# Patient Record
Sex: MALE | Race: WHITE
[De-identification: names, ages, dates, MRNs, and addresses within clinical notes are randomized per-mention and may not be internally consistent; named-entity substitution may affect disease eponyms.]

---

## 2017-12-15 ENCOUNTER — HOSPITAL ENCOUNTER (EMERGENCY)
Dept: HOSPITAL 80 - FED | Age: 27
Discharge: HOME | End: 2017-12-15
Payer: MEDICAID

## 2017-12-15 VITALS — RESPIRATION RATE: 16 BRPM

## 2017-12-15 VITALS — SYSTOLIC BLOOD PRESSURE: 129 MMHG | TEMPERATURE: 97.7 F | DIASTOLIC BLOOD PRESSURE: 82 MMHG

## 2017-12-15 VITALS — OXYGEN SATURATION: 97 % | HEART RATE: 88 BPM

## 2017-12-15 DIAGNOSIS — Z87.891: ICD-10-CM

## 2017-12-15 DIAGNOSIS — F41.9: Primary | ICD-10-CM

## 2018-10-31 ENCOUNTER — HOSPITAL ENCOUNTER (EMERGENCY)
Dept: HOSPITAL 80 - FED | Age: 28
Discharge: HOME | End: 2018-10-31
Payer: COMMERCIAL

## 2018-10-31 VITALS — DIASTOLIC BLOOD PRESSURE: 74 MMHG | SYSTOLIC BLOOD PRESSURE: 134 MMHG

## 2018-10-31 DIAGNOSIS — F41.9: ICD-10-CM

## 2018-10-31 DIAGNOSIS — R11.2: Primary | ICD-10-CM

## 2018-10-31 DIAGNOSIS — E86.9: ICD-10-CM

## 2018-10-31 NOTE — EDPHY
H & P


Time Seen by Provider: 10/31/18 16:45


HPI/ROS: 





CHIEF COMPLAINT:  Vomiting and anxiety attack





HISTORY OF PRESENT ILLNESS:  The patient is a 28-year-old man with a history of 

anxiety who reports that this morning he woke up nauseous and has vomited 

several times throughout the day.  No diarrhea.  No abdominal pain.  He began 

to have a panic attack and feels short of breath and called EMS.  EMS 

administered Zofran and Phenergan and albuterol.  Patient's nausea has improved 

but he still feels a bit jittery and tachycardic after the albuterol.  He was 

never hypoxic.  No fever.  No diarrhea.  No urinary symptoms.  He does have a 

history of appendectomy.


Severity:  Moderate


Modifying factors:  Improving with the EMS medication





REVIEW OF SYSTEMS:


Constitutional:  denies: chills, fever, recent illness, recent injury


EENTM: denies: blurred vision, double vision, nose congestion


Respiratory:  See HPI denies: cough


Cardiac: denies: chest pain, irregular heart rate, lightheadedness, palpitations


Gastrointestinal/Abdominal:  See HPI denies: abdominal pain, diarrhea, blood 

streaked stools


Genitourinary: denies: dysuria, frequency, hematuria, pain


Musculoskeletal: denies: joint pain, muscle pain


Skin: denies: lesions, rash, jaundice, bruising


Neurological: denies: headache, numbness, paresthesia, tingling, dizziness, 

weakness


Hematologic/Lymphatic: denies: blood clots, easy bleeding, easy bruising


Immunologic/allergic: denies: HIV/AIDS, transplant


 10 systems reviewed and negative except as noted





EXAM:


GENERAL:  Well-appearing, somewhat anxious 


HEAD:  Atraumatic, normocephalic.


EYES:  Pupils equal round and reactive to light, extraocular movements intact, 

sclera anicteric, conjunctiva are normal.


ENT:  TMs normal, nares patent, oropharynx clear without exudates.  Moist 

mucous membranes.


NECK:  Normal range of motion, supple without lymphadenopathy or JVD.


LUNGS:  Breath sounds clear to auscultation bilaterally and equal.  No wheezes 

rales or rhonchi.


HEART:  Regular rate and rhythm without murmurs, rubs or gallops.


ABDOMEN:  Soft, nontender, normoactive bowel sounds.  No guarding, no rebound.  

No masses appreciated. 


BACK:  No CVA tenderness, no spinal tenderness, step-offs or deformities


EXTREMITIES:  Normal range of motion, no pitting or edema.  No clubbing or 

cyanosis.


NEUROLOGICAL:  Cranial nerves II through XII grossly intact.  Normal speech, 

normal gait.  5/5 strength, normal movement in all extremities, normal sensation

, normal reflexes


PSYCH:  Normal mood, normal affect.


SKIN:  Warm, dry, normal turgor, no visible rashes or lesions.








Source: Patient, EMS


Exam Limitations: No limitations





- Medical/Surgical History


Hx Asthma: No


Hx Chronic Respiratory Disease: No


Hx Diabetes: No


Hx Cardiac Disease: No


Hx Renal Disease: No


Hx Cirrhosis: No


Other PMH: REFLUX, APPY





- Family History


Significant Family History: No pertinent family hx





- Social History


Smoking Status: Former smoker


Alcohol Use: Sober


Drug Use: None


Constitutional: 


 Initial Vital Signs











Temperature (C)  36.8 C   10/31/18 17:00


 


Heart Rate  96   10/31/18 17:00


 


Respiratory Rate  18   10/31/18 17:00


 


Blood Pressure  151/112 H  10/31/18 17:00


 


O2 Sat (%)  96   10/31/18 17:00








 











O2 Delivery Mode               Room Air














Allergies/Adverse Reactions: 


 





No Known Allergies Allergy (Unverified 10/31/18 17:03)


 








Home Medications: 














 Medication  Instructions  Recorded


 


Citalopram  10/31/18














Medical Decision Making


ED Course/Re-evaluation: 





Patient is no longer vomiting after receiving medications from EMS.  He is 

oxygenating well.  His abdominal exam is benign.





5:30 p.m. the patient is sleeping comfortably with stable vital signs.  His 

heart rate is 102 while asleep.  Clinically still looks dry.  He has a dry 

tongue.  I will hang 1/3 L of fluid and have him start taking p.o. Fluids.  He 

no longer feels nauseous.  He does not have any abdominal pain or tenderness.  

He is saturating 96% room air while asleep.





7:05 p.m. the patient is doing well.  His vital signs are stable.  He is asking 

for my anxiety medicine.  His heart rate is 90. He does get a little anxious 

his heart rate comes up to 110 when I into the room.  Abdomen feels great but 

he still feels anxious.  I will give him another dose of Ativan.





7:30 p.m. the patient continues to feel well.  He is eager to go home.


Differential Diagnosis: 





Partial list of the Differential diagnosis considered include but were not 

limited to;  gastroenteritis, food poisoning, and although unlikely based on 

the history and physical exam, I also considered obstruction, biliary disease, 

pancreatic disease, diverticulitis, volvulus.  I discussed these differential 

diagnoses and the plan with the patient as well as the usual and expected 

course.  The patient understands that the diagnosis is provisional and that in 

medicine we are not always correct and that further workup is often warranted.  

Usual and customary warnings were given.  All of the patient's questions were 

answered.  The patient was instructed to return to the emergency department 

should the symptoms at all worsen or return, otherwise to followup with the 

physician as we discussed.





- Data Points


Medications Given: 


 








Discontinued Medications





Sodium Chloride (Ns)  1,000 mls @ 0 mls/hr IV EDNOW ONE; Wide Open


   PRN Reason: Protocol


   Stop: 10/31/18 16:44


   Last Admin: 10/31/18 16:58 Dose:  1,000 mls


Sodium Chloride (Ns)  1,000 mls @ 0 mls/hr IV EDNOW ONE; Wide Open


   PRN Reason: Protocol


   Stop: 10/31/18 17:34


   Last Admin: 10/31/18 17:35 Dose:  1,000 mls


Lorazepam (Ativan Injection)  1 mg IVP EDNOW ONE


   Stop: 10/31/18 16:45


   Last Admin: 10/31/18 16:58 Dose:  1 mg


Lorazepam (Ativan Injection)  1 mg IVP EDNOW ONE


   Stop: 10/31/18 19:06


   Last Admin: 10/31/18 19:14 Dose:  1 mg


Ondansetron HCl (Zofran Odt 4 Mg Prepack#2)  1 btl TAKEHOME EDNOW ONE


   Stop: 10/31/18 19:10


   Last Admin: 10/31/18 19:38 Dose:  1 btl








Departure





- Departure


Disposition: Home, Routine, Self-Care


Clinical Impression: 


 Anxiety





Vomiting


Qualifiers:


 Vomiting type: unspecified Vomiting Intractability: unspecified Nausea presence

: with nausea Qualified Code(s): R11.2 - Nausea with vomiting, unspecified





Condition: Fair


Instructions:  Ondansetron (By mouth), Acute Nausea and Vomiting (ED), Anxiety (

ED)


Referrals: 


Patient,NotPresent [Unknown] - As per Instructions


Jayne Nicholson MD [[facility] Primary Care Provider] - 2-3 days, call for appt.